# Patient Record
Sex: FEMALE | Race: BLACK OR AFRICAN AMERICAN | Employment: OTHER | URBAN - METROPOLITAN AREA
[De-identification: names, ages, dates, MRNs, and addresses within clinical notes are randomized per-mention and may not be internally consistent; named-entity substitution may affect disease eponyms.]

---

## 2019-02-19 ENCOUNTER — HOSPITAL ENCOUNTER (EMERGENCY)
Facility: CLINIC | Age: 69
Discharge: HOME | End: 2019-02-19
Attending: NURSE PRACTITIONER | Admitting: NURSE PRACTITIONER
Payer: COMMERCIAL

## 2019-02-19 VITALS
RESPIRATION RATE: 16 BRPM | TEMPERATURE: 98.2 F | WEIGHT: 237 LBS | DIASTOLIC BLOOD PRESSURE: 74 MMHG | OXYGEN SATURATION: 96 % | SYSTOLIC BLOOD PRESSURE: 169 MMHG | BODY MASS INDEX: 41.99 KG/M2 | HEIGHT: 63 IN

## 2019-02-19 DIAGNOSIS — J06.9 URI (UPPER RESPIRATORY INFECTION): ICD-10-CM

## 2019-02-19 DIAGNOSIS — H92.03 OTALGIA, BILATERAL: ICD-10-CM

## 2019-02-19 LAB
DEPRECATED S PYO AG THROAT QL EIA: NORMAL
SPECIMEN SOURCE: NORMAL

## 2019-02-19 PROCEDURE — 99283 EMERGENCY DEPT VISIT LOW MDM: CPT

## 2019-02-19 PROCEDURE — 87081 CULTURE SCREEN ONLY: CPT | Performed by: NURSE PRACTITIONER

## 2019-02-19 PROCEDURE — 87880 STREP A ASSAY W/OPTIC: CPT | Performed by: NURSE PRACTITIONER

## 2019-02-19 RX ORDER — GUAIFENESIN 600 MG/1
1200 TABLET, EXTENDED RELEASE ORAL 2 TIMES DAILY PRN
Qty: 10 TABLET | Refills: 0 | Status: SHIPPED | OUTPATIENT
Start: 2019-02-19 | End: 2019-02-24

## 2019-02-19 SDOH — HEALTH STABILITY: MENTAL HEALTH: HOW OFTEN DO YOU HAVE A DRINK CONTAINING ALCOHOL?: NEVER

## 2019-02-19 ASSESSMENT — ENCOUNTER SYMPTOMS
PHOTOPHOBIA: 0
WEAKNESS: 0
FATIGUE: 0
HEADACHES: 0
APPETITE CHANGE: 0
WHEEZING: 0
ACTIVITY CHANGE: 0
LIGHT-HEADEDNESS: 0
CHILLS: 0
DIAPHORESIS: 0
EYE PAIN: 0
DYSURIA: 0
ARTHRALGIAS: 0
ABDOMINAL PAIN: 0
FEVER: 0
TROUBLE SWALLOWING: 0
SINUS PAIN: 0
COUGH: 0
VOMITING: 0
RHINORRHEA: 1
MYALGIAS: 0
SORE THROAT: 0
SHORTNESS OF BREATH: 0
DIZZINESS: 0
NAUSEA: 0

## 2019-02-19 ASSESSMENT — MIFFLIN-ST. JEOR: SCORE: 1569.15

## 2019-02-19 NOTE — ED AVS SNAPSHOT
Emergency Department  [address] [place] [id number]  Phone:  [phone]  Fax:  [phone]                                    No Humphreys   MRN: [id number]    Department:   Emergency Department   Date of Visit:  2/19/2019           After Visit Summary Signature Page    I have received my discharge instructions, and my questions have been answered. I have discussed any challenges I see with this plan with the nurse or doctor.    ..........................................................................................................................................  Patient/Patient Representative Signature      ..........................................................................................................................................  Patient Representative Print Name and Relationship to Patient    ..................................................               ................................................  Date                                   Time    ..........................................................................................................................................  Reviewed by Signature/Title    ...................................................              ..............................................  Date                                               Time          22EPIC Rev 08/18

## 2019-02-20 NOTE — DISCHARGE INSTRUCTIONS

## 2019-02-20 NOTE — ED PROVIDER NOTES
History     Chief Complaint:  Otalgia    HPI   No Humphreys is a 68 year old female with a history of myeloma and diabetes who presents for evaluation of otalgia. No notes onset of sinus congestion and post nasal drip approximately 4 days ago. She notes 2 days ago she felt pressure in her bilateral ears and is concerned for ear infection.  She has very mild cough with no shortness of breath or chest pain. She denies fevers, headache, vision changes.  She contacted her oncologist, Dr. Michael Kingsley, on 2/14 and was recommended to start over-the-counter medications. She states she did not start these medications as she wanted an examination first. She did not go to her appointment with her primary care due to the snow and therefore presents to the emergency department. She notes a granddaughter having similar symptoms in the 2 days before onset of her symptoms. She has been eating and drinking without difficulty. She is taking no medications for her symptoms.    Care Everywhere Chart Review:  Oncology/Hematolgy Progress Note 1/18/2019  Dr. Mando Kingsley  Current myeloma treatment regimen: Revlimid maintenance, 3 week on, 1 week off.  Anticoagulated with 81mg ASA daily while on Revlimid. On MS contin for pain management. Goals of treatment: palliative.    Allergies:  NKDA    Medications:    The patient is currently on no regular medications.    Past Medical History:    Multiple myeloma, recurrent IgG myeloma, November 2000  Stage III colon cancer, May 2005  Rectal cancer, Septmber 2009  Diabetes, type II  DVT    Past Surgical History:    The patient does not have any pertinent past surgical history.    Family History:    No past pertinent family history.    Social History:  Marital Status:  Single [1]  Negative for tobacco use.  Negative for alcohol use.  Negative for drug use.     Review of Systems   Constitutional: Negative for activity change, appetite change, chills, diaphoresis, fatigue and fever.   HENT:  "Positive for congestion, ear pain, postnasal drip and rhinorrhea. Negative for dental problem, ear discharge, hearing loss, sinus pain, sore throat, tinnitus and trouble swallowing.    Eyes: Negative for photophobia, pain and visual disturbance.   Respiratory: Negative for cough, shortness of breath and wheezing.    Cardiovascular: Negative for chest pain.   Gastrointestinal: Negative for abdominal pain, nausea and vomiting.   Genitourinary: Negative for dysuria.   Musculoskeletal: Negative for arthralgias and myalgias.   Skin: Negative for rash.   Neurological: Negative for dizziness, weakness, light-headedness and headaches.     Physical Exam     Patient Vitals for the past 24 hrs:   BP Temp Temp src Heart Rate Resp SpO2 Height Weight   02/19/19 1747 169/74 98.2  F (36.8  C) Oral 89 16 96 % 1.6 m (5' 3\") 107.5 kg (237 lb)     Physical Exam  Nursing notes reviewed. Vitals reviewed.  General: Alert. Well kept.  Eyes:  Conjunctiva non-injected, non-icteric.  Ears:TM s normal.  Neck/Throat: Moist mucous membranes, oropharynx clear without erythema or exudate. No cervical, submandibular, submental lymphadenopathy.  Normal voice.  Cardiac: Regular rhythm. Normal heart sounds with no murmur/rubs/click.   Pulmonary: Clear and equal breath sounds bilaterally. No crackles/rales. No wheezing  Abdomen: Soft. Non-distended. Non-tender to palpation. No masses. No guarding or rebound.  Musculoskeletal: Normal gross range of motion of all 4 extremities.    Neurological: Alert and oriented x4.   Skin: Warm and dry without rashes or petechiae. Normal appearance of visualized exposed skin.  Psych: Affect normal. Good eye contact.    Emergency Department Course   Laboratory:  Labs Ordered and Resulted from Time of ED Arrival Up to the Time of Departure from the ED   RAPID STREP SCREEN   BETA STREP GROUP A CULTURE     Emergency Department Course:  Past medical records, nursing notes, and vitals reviewed.  I performed an exam of the " patient and obtained history, as documented above.    I rechecked the patient. Findings and plan explained to the Patient and family. Patient was discharged home.  Impression & Plan    Medical Decision Making:  No Humphreys is a 68 year old female with current history of Myeloma who presents for evaluation of bilateral otalgia.  The patient has a normal exam with no fevers.  Differential considered in this patient with otalgia included mastoiditis, meningitis, perforation, cerumen impaction, mass, dental abscess, or peritonsillar abscess, referred pain, cholesteatoma, otitis externa, dural venous thrombosis, etc. Given exam, the most likely etiology of the pain is referred ear pain from post-nasal drip.  The patient had onset of symptoms with URI and similar symptoms in close family member.  Return if increasing pain, fever, decrease in hearing or ear discharge.  She did speak with her oncologist who recommended over the counter medications but patient wanted an evaluation before starting any medications.  I will start her on Mucinex as directed by her oncologist. Follow-up with primary physician in 2-3 days.    Diagnosis:    ICD-10-CM    1. URI (upper respiratory infection) J06.9 Beta strep group A culture   2. Otalgia, bilateral H92.03      Disposition:  discharged to home    Discharge Medications:     Medication List      Started    guaiFENesin 600 MG 12 hr tablet  Commonly known as:  MUCINEX  1,200 mg, Oral, 2 TIMES DAILY PRN          Scribe Disclosure:  Claudia BRYANT, am serving as a scribe on 2/19/2019 at 6:07 PM to personally document services performed by Joanne Aguilera CNP based on my observations and the provider's statements to nas [place]  2/19/2019    EMERGENCY DEPARTMENT       Joanne Aguilera CNP  02/19/19 2042

## 2019-02-21 LAB
BACTERIA SPEC CULT: NORMAL
Lab: NORMAL
SPECIMEN SOURCE: NORMAL

## 2019-02-21 NOTE — RESULT ENCOUNTER NOTE
Final Beta strep group A r/o culture is NEGATIVE for Group A streptococcus.    No treatment or change in treatment per [facility] Strep protocol.

## 2019-04-13 ENCOUNTER — OFFICE VISIT (OUTPATIENT)
Dept: URGENT CARE | Facility: URGENT CARE | Age: 69
End: 2019-04-13
Payer: COMMERCIAL

## 2019-04-13 VITALS
TEMPERATURE: 98.8 F | OXYGEN SATURATION: 96 % | WEIGHT: 238 LBS | HEART RATE: 84 BPM | RESPIRATION RATE: 22 BRPM | SYSTOLIC BLOOD PRESSURE: 124 MMHG | DIASTOLIC BLOOD PRESSURE: 84 MMHG | BODY MASS INDEX: 42.16 KG/M2

## 2019-04-13 DIAGNOSIS — R06.2 WHEEZING: ICD-10-CM

## 2019-04-13 DIAGNOSIS — R05.8 PRODUCTIVE COUGH: Primary | ICD-10-CM

## 2019-04-13 PROBLEM — Z86.718 H/O DEEP VENOUS THROMBOSIS: Status: ACTIVE | Noted: 2018-04-06

## 2019-04-13 PROCEDURE — 99203 OFFICE O/P NEW LOW 30 MIN: CPT | Mod: 25 | Performed by: PHYSICIAN ASSISTANT

## 2019-04-13 PROCEDURE — 94640 AIRWAY INHALATION TREATMENT: CPT | Performed by: PHYSICIAN ASSISTANT

## 2019-04-13 RX ORDER — MORPHINE SULFATE 60 MG/1
60 TABLET, FILM COATED, EXTENDED RELEASE ORAL 2 TIMES DAILY
Refills: 0 | COMMUNITY
Start: 2019-03-14

## 2019-04-13 RX ORDER — AMLODIPINE BESYLATE 10 MG/1
10 TABLET ORAL
COMMUNITY
Start: 2019-02-28

## 2019-04-13 RX ORDER — INSULIN GLARGINE 100 [IU]/ML
32 INJECTION, SOLUTION SUBCUTANEOUS
COMMUNITY
Start: 2019-03-04 | End: 2020-03-03

## 2019-04-13 RX ORDER — DOXYCYCLINE 100 MG/1
100 CAPSULE ORAL 2 TIMES DAILY
Qty: 20 CAPSULE | Refills: 0 | Status: SHIPPED | OUTPATIENT
Start: 2019-04-13 | End: 2019-04-23

## 2019-04-13 RX ORDER — LENALIDOMIDE 25 MG/1
CAPSULE ORAL
COMMUNITY
Start: 2019-03-27

## 2019-04-13 RX ORDER — ALBUTEROL SULFATE 90 UG/1
2 AEROSOL, METERED RESPIRATORY (INHALATION) EVERY 6 HOURS
Qty: 18 G | Refills: 0 | Status: SHIPPED | OUTPATIENT
Start: 2019-04-13 | End: 2020-04-12

## 2019-04-13 RX ORDER — LORAZEPAM 0.5 MG/1
0.5 TABLET ORAL
COMMUNITY
Start: 2019-01-08

## 2019-04-13 RX ORDER — LEVALBUTEROL INHALATION SOLUTION 1.25 MG/3ML
1.25 SOLUTION RESPIRATORY (INHALATION) ONCE
Status: COMPLETED | OUTPATIENT
Start: 2019-04-13 | End: 2019-04-13

## 2019-04-13 RX ADMIN — LEVALBUTEROL INHALATION SOLUTION 1.25 MG: 1.25 SOLUTION RESPIRATORY (INHALATION) at 19:27

## 2019-04-13 NOTE — PROGRESS NOTES
"Patient presents with:  Cough: cough,sweats for 4 days      SUBJECTIVE:   No Humphreys is a 68 year old female presenting with a chief complaint of   1) productive cough for the past 4 days  2) feverish  Denies any body aches, runny nose or sore throat or wheezing.  Denies any shortness of breath    She did NOT have a flu vaccination this season.     Onset of symptoms was as above.  Course of illness is worsening.    Severity moderate  Current and Associated symptoms:   Treatment measures tried include as above.  Predisposing factors include no flu vaccination this season.  Does have an O2 tank at home, uses as needed    Past Medical History:   Diagnosis Date     Cancer (H)     \"bone cancer\"     Diabetes (H)      Patient Active Problem List   Diagnosis     Essential hypertension     H/O deep venous thrombosis     Malignant neoplasm of rectum (H)     SIRS due to infectious process without acute organ dysfunction (H)     DM (diabetes mellitus), type 2 (H)     Social History     Tobacco Use     Smoking status: Never Smoker     Smokeless tobacco: Never Used   Substance Use Topics     Alcohol use: No     Frequency: Never       ROS:  CONSTITUTIONAL:as per HPI  INTEGUMENTARY/SKIN: NEGATIVE for worrisome rashes, moles or lesions  EYES: NEGATIVE for vision changes or irritation  ENT/MOUTH: as per HPI  RESP:as per HPI  CV: NEGATIVE for chest pain, palpitations or peripheral edema  GI: NEGATIVE for nausea, abdominal pain, heartburn, or change in bowel habits  MUSCULOSKELETAL: NEGATIVE for significant arthralgias or myalgia  NEURO: NEGATIVE for weakness, dizziness or paresthesias  ENDOCRINE: NEGATIVE for temperature intolerance, skin/hair changes  Review of systems negative except as stated above.    OBJECTIVE  :/84 (Cuff Size: Adult Regular)   Pulse 84   Temp 98.8  F (37.1  C) (Oral)   Resp 22   Wt 108 kg (238 lb)   SpO2 92%   BMI 42.16 kg/m    GENERAL APPEARANCE: healthy, alert and no distress  EYES: EOMI,  PERRL, " conjunctiva clear  HENT: ear canals and TM's normal.  Nose and mouth without ulcers, erythema or lesions  NECK: supple, nontender, no lymphadenopathy  RESP: wheezing throughout which improved after neb treatment in clinic to reveal a few scattered rhonchi  Pulse ox after neb 96%  CV: regular rates and rhythm, normal S1 S2, grade 2 systolic murmur   ABDOMEN:  soft, nontender, no HSM or masses and bowel sounds normal  NEURO: Normal strength and tone, sensory exam grossly normal,  normal speech and mentation  SKIN: no suspicious lesions or rashes    (R05) Productive cough  (primary encounter diagnosis)  Comment:   Plan: doxycycline hyclate (VIBRAMYCIN) 100 MG capsule            (R06.2) Wheezing  Comment:   Plan: levalbuterol (XOPENEX) neb solution 1.25 mg,         albuterol (PROAIR HFA/PROVENTIL HFA/VENTOLIN         HFA) 108 (90 Base) MCG/ACT inhaler          Use oxygen at home until follow up appointment.      Follow up with primary clinic for re-check in 2-3 days, to ER should symptoms worsen or persist.      Patient expresses understanding and agreement with the assessment and plan as above.

## 2019-04-14 NOTE — PATIENT INSTRUCTIONS
(R05) Productive cough  (primary encounter diagnosis)  Comment:   Plan: doxycycline hyclate (VIBRAMYCIN) 100 MG capsule            (R06.2) Wheezing  Comment:   Plan: levalbuterol (XOPENEX) neb solution 1.25 mg,         albuterol (PROAIR HFA/PROVENTIL HFA/VENTOLIN         HFA) 108 (90 Base) MCG/ACT inhaler          Use oxygen at home until follow up appointment.      Follow up with primary clinic for re-check in 2-3 days, to ER should symptoms worsen or persist.

## 2022-01-01 ENCOUNTER — LAB REQUISITION (OUTPATIENT)
Dept: LAB | Facility: CLINIC | Age: 72
End: 2022-01-01
Payer: MEDICARE

## 2022-01-01 DIAGNOSIS — I10 ESSENTIAL (PRIMARY) HYPERTENSION: ICD-10-CM

## 2022-01-01 DIAGNOSIS — D64.9 ANEMIA, UNSPECIFIED: ICD-10-CM

## 2022-01-01 DIAGNOSIS — I50.9 HEART FAILURE, UNSPECIFIED (H): ICD-10-CM

## 2022-01-01 DIAGNOSIS — R41.82 ALTERED MENTAL STATUS, UNSPECIFIED: ICD-10-CM

## 2022-01-01 LAB
ANION GAP SERPL CALCULATED.3IONS-SCNC: 6 MMOL/L (ref 7–15)
BUN SERPL-MCNC: 9.1 MG/DL (ref 8–23)
CALCIUM SERPL-MCNC: 10.3 MG/DL (ref 8.8–10.2)
CHLORIDE SERPL-SCNC: 98 MMOL/L (ref 98–107)
CREAT SERPL-MCNC: 0.61 MG/DL (ref 0.51–0.95)
DEPRECATED HCO3 PLAS-SCNC: 31 MMOL/L (ref 22–29)
ERYTHROCYTE [DISTWIDTH] IN BLOOD BY AUTOMATED COUNT: 22.2 % (ref 10–15)
ERYTHROCYTE [DISTWIDTH] IN BLOOD BY AUTOMATED COUNT: 22.3 % (ref 10–15)
GFR SERPL CREATININE-BSD FRML MDRD: >90 ML/MIN/1.73M2
GLUCOSE SERPL-MCNC: 100 MG/DL (ref 70–99)
HCT VFR BLD AUTO: 22.6 % (ref 35–47)
HCT VFR BLD AUTO: 24.8 % (ref 35–47)
HGB BLD-MCNC: 6.7 G/DL (ref 11.7–15.7)
HGB BLD-MCNC: 7.4 G/DL (ref 11.7–15.7)
MCH RBC QN AUTO: 27.6 PG (ref 26.5–33)
MCH RBC QN AUTO: 27.9 PG (ref 26.5–33)
MCHC RBC AUTO-ENTMCNC: 29.6 G/DL (ref 31.5–36.5)
MCHC RBC AUTO-ENTMCNC: 29.8 G/DL (ref 31.5–36.5)
MCV RBC AUTO: 93 FL (ref 78–100)
MCV RBC AUTO: 94 FL (ref 78–100)
PLATELET # BLD AUTO: 103 10E3/UL (ref 150–450)
PLATELET # BLD AUTO: 62 10E3/UL (ref 150–450)
POTASSIUM SERPL-SCNC: 3.6 MMOL/L (ref 3.4–5.3)
RBC # BLD AUTO: 2.4 10E6/UL (ref 3.8–5.2)
RBC # BLD AUTO: 2.68 10E6/UL (ref 3.8–5.2)
SODIUM SERPL-SCNC: 135 MMOL/L (ref 136–145)
WBC # BLD AUTO: 4.3 10E3/UL (ref 4–11)
WBC # BLD AUTO: 4.6 10E3/UL (ref 4–11)

## 2022-01-01 PROCEDURE — 80048 BASIC METABOLIC PNL TOTAL CA: CPT | Mod: ORL | Performed by: FAMILY MEDICINE

## 2022-01-01 PROCEDURE — 36415 COLL VENOUS BLD VENIPUNCTURE: CPT | Mod: ORL | Performed by: FAMILY MEDICINE

## 2022-01-01 PROCEDURE — P9604 ONE-WAY ALLOW PRORATED TRIP: HCPCS | Mod: ORL | Performed by: FAMILY MEDICINE

## 2022-01-01 PROCEDURE — 85027 COMPLETE CBC AUTOMATED: CPT | Mod: ORL | Performed by: FAMILY MEDICINE

## 2022-01-01 PROCEDURE — P9603 ONE-WAY ALLOW PRORATED MILES: HCPCS | Mod: ORL | Performed by: FAMILY MEDICINE
